# Patient Record
Sex: MALE | Race: WHITE | Employment: OTHER | URBAN - METROPOLITAN AREA
[De-identification: names, ages, dates, MRNs, and addresses within clinical notes are randomized per-mention and may not be internally consistent; named-entity substitution may affect disease eponyms.]

---

## 2021-07-20 ENCOUNTER — OFFICE VISIT (OUTPATIENT)
Dept: PAIN MANAGEMENT | Age: 76
End: 2021-07-20
Payer: MEDICARE

## 2021-07-20 VITALS
WEIGHT: 198 LBS | DIASTOLIC BLOOD PRESSURE: 84 MMHG | HEART RATE: 96 BPM | BODY MASS INDEX: 30.01 KG/M2 | HEIGHT: 68 IN | TEMPERATURE: 97.6 F | SYSTOLIC BLOOD PRESSURE: 144 MMHG

## 2021-07-20 DIAGNOSIS — Z98.1 HISTORY OF FUSION OF CERVICAL SPINE: ICD-10-CM

## 2021-07-20 DIAGNOSIS — M47.817 LUMBOSACRAL SPONDYLOSIS WITHOUT MYELOPATHY: Primary | ICD-10-CM

## 2021-07-20 DIAGNOSIS — M47.812 CERVICAL SPONDYLOSIS WITHOUT MYELOPATHY: ICD-10-CM

## 2021-07-20 PROCEDURE — 99203 OFFICE O/P NEW LOW 30 MIN: CPT | Performed by: PHYSICAL MEDICINE & REHABILITATION

## 2021-07-20 PROCEDURE — G8417 CALC BMI ABV UP PARAM F/U: HCPCS | Performed by: PHYSICAL MEDICINE & REHABILITATION

## 2021-07-20 PROCEDURE — 3017F COLORECTAL CA SCREEN DOC REV: CPT | Performed by: PHYSICAL MEDICINE & REHABILITATION

## 2021-07-20 PROCEDURE — 1036F TOBACCO NON-USER: CPT | Performed by: PHYSICAL MEDICINE & REHABILITATION

## 2021-07-20 PROCEDURE — 1123F ACP DISCUSS/DSCN MKR DOCD: CPT | Performed by: PHYSICAL MEDICINE & REHABILITATION

## 2021-07-20 PROCEDURE — G8427 DOCREV CUR MEDS BY ELIG CLIN: HCPCS | Performed by: PHYSICAL MEDICINE & REHABILITATION

## 2021-07-20 PROCEDURE — 4040F PNEUMOC VAC/ADMIN/RCVD: CPT | Performed by: PHYSICAL MEDICINE & REHABILITATION

## 2021-07-20 ASSESSMENT — ENCOUNTER SYMPTOMS
BACK PAIN: 1
NAUSEA: 0
DIARRHEA: 0
SHORTNESS OF BREATH: 0
CONSTIPATION: 0

## 2021-07-20 NOTE — PROGRESS NOTES
David Fagan  (1945)    7/20/2021    Subjective:     David Fagan is 76 y.o. male who complains today of:    Chief Complaint   Patient presents with    Back Pain       David Fagan is a 76 y.o. male who presents for evaluation for low back pain. He found out about us from Dr Simone Plummer office staff. He has struggled with pain since 1970s. He denies any immediately-preceding traumatic or inciting events. He describes pain located in both sides of his low back, no leg pain. Pain is a constant ache and is currently a 7/10 and gets up to a 9/10 at its worst and goes down to a 6/10 at its best. Pain is worse with bending and walking. Pain is better with laying down. Pain is located 50% on the right and 50% on the left. Pain is located 100% in the back and 0% in the legs. Neck pain located in both sides of his neck. Located in both sides of his neck. Pain is a 6/10. Gets to a 8/10. Worse with turning his neck and activity. Better with rest and laying down. He denies any numbness, tingling, weakness, bowel or bladder dysfunction, saddle anesthesia, falls, history of cancer, unexplained weight loss, persistent night pain and sweats, fever, IV drug abuse, immunocompromise, chronic prednisone or antibiotic use, or any other red flag symptoms. Mood is down, denies any suicidal or homicidal ideation. Sleep is poor, awakes fatigued.     He has tried:  Home exercise program with minimal relief  Cervical spine fusion C4-6  Compression fracture T7 and T8  Radiofrequency ablation, epidurals, trigger point injections min relief  Chiropractor hurt worse  Acupuncture min relief  Water therapy min relief    Diagnostic testing previously performed includes XRs MRI    Medications tried include:  Acetaminophen with minimal relief for over 3 months  Ibuprofen with minimal relief for over 3 months  Methadone 15 mg BID from [facility] pain clinic, had fast erratic heartbeat with antidepressant, no longer on Methadone. Tramadol min relief  OxyContin \"I refuse to take\" cognitive dysfunction  Fentanyl patch \"I didn't like that\"  Percocet 10/325 mg BID alternating with Dilaudid for 6 months at a time while in [address] [place] [address], Medications, Past Medical History, Family History, Social History, Work History, and Review of Systems reviewed below    +Irregular heartbeat now corrected  +Kidney stones  +obstructive sleep apnea on CPAP  +Depression     No Seizures, Epilepsy or Brain Surgery     Spends his time: [employer]. Unfit for full duty after 9 years. He used to enjoy riding his motorcycle. Allergies:  Iodine, Methadone, Penicillins, and Simvastatin    Past Medical History:   Diagnosis Date    Arthritis     Chronic pain     Depression     Low back pain     Sleep apnea      Past Surgical History:   Procedure Laterality Date    SPINAL CORD STIMULATOR SURGERY      SPINE SURGERY       Family History   Problem Relation Age of Onset    Cancer Mother      Social History     Socioeconomic History    Marital status:      Spouse name: Not on file    Number of children: Not on file    Years of education: Not on file    Highest education level: Not on file   Occupational History    Not on file   Tobacco Use    Smoking status: Former Smoker     Years: 17.00    Smokeless tobacco: Never Used   Substance and Sexual Activity    Alcohol use: Never    Drug use: Not on file    Sexual activity: Not on file   Other Topics Concern    Not on file   Social History Narrative    Not on file     Social Determinants of Health     Financial Resource Strain:     Difficulty of Paying Living Expenses:    Food Insecurity:     Worried About Running Out of Food in the Last Year:     [address] in the Last Year:    Transportation Needs:     Lack of Transportation (Medical):      Lack of Transportation (Non-Medical):    Physical Activity:     Days of Exercise per Week:     Minutes of Exercise per Session: Stress:     Feeling of Stress :    Social Connections:     Frequency of Communication with Friends and Family:     Frequency of Social Gatherings with Friends and Family:     Attends Congregation Services:     Active Member of Clubs or Organizations:     Attends Club or Organization Meetings:     Marital Status:    Intimate Partner Violence:     Fear of Current or Ex-Partner:     Emotionally Abused:     Physically Abused:     Sexually Abused:        No current outpatient medications on file prior to visit. No current facility-administered medications on file prior to visit. Review of Systems   Constitutional: Negative for fever. HENT: Negative for hearing loss. Respiratory: Negative for shortness of breath. Gastrointestinal: Negative for constipation, diarrhea and nausea. Genitourinary: Negative for difficulty urinating. Musculoskeletal: Positive for back pain and neck pain. Skin: Negative for rash. Neurological: Negative for headaches. Hematological: Does not bruise/bleed easily. Psychiatric/Behavioral: Negative for sleep disturbance. Objective:     Vitals:  BP (!) 144/84 (Site: Left Upper Arm)   Pulse 96   Temp 97.6 °F (36.4 °C)   Ht 5' 8\" (1.727 m)   Wt 198 lb (89.8 kg)   BMI 30.11 kg/m² Pain Score:   8      Exam performed under Coronavirus precautions  Gen: No acute distress  Neck: Grossly symmetric without any significant thyromegaly or masses appreciated. Eyes: No scleral icterus or lid lag appreciated bilaterally. Irises without gross defects bilaterally. HEENT: Hearing grossly intact bilaterally. Normocephalic, external ears and visible portions of nose and mouth atraumatic. Lymph: No gross neck or axillary lymphadenopathy  Cardio: No significant lower extremity edema, pulses intact without significant digit ischemia. Abd: No gross masses or large hernias appreciated. Skin: Visualized skin without any dermatomal rashes or sores.  Palpation free of any tightening or subcutaneous nodules. MSK: Gait is antalgic. No significant upper limb digit ischemia appreciated. Psych: Pleasant and cooperative with the history and exam. Mood and Affect normal. Appropriately dressed with good eye contact. Judgement and insight normal. Recent and remote memory intact. Alert and Oriented x3. Neuro: Cranial nerves II-XII grossly intact. No significant pathologic reflexes appreciated. Rises from a seated to standing position with moderate difficulty. Gait is antalgic. +sp cane    Heel and toe walk intact. Lumbar flexion to 50 degrees, extension to 10 degrees. Limited lumbar spine range of motion. Rotation and extension reproduces axial low back pain. Other facet provocative maneuvers are positive. No gross step offs noted. Tenderness to palpation over the mid to low lumbar spinous processes and bilateral lumbar paraspinals from L2 down to the sacrum. No tenderness over bilateral PSIS. No tenderness over bilateral greater trochanters. No tenderness over bilateral deep gluteal regions. Sensation grossly intact in both legs. Reflexes and strength functional for ambulation, no abnormal reflexes appreciated on exam today  Strength greater than 3/5 bilateral legs  Straight leg raise negative bilaterally. Sensation intact in both arms  Reflexes and strength functional for arm use, no abnormal reflexes appreciated on exam today  Strength greater than 3/5 in both arms  Spurling's negative on exam today    There is tenderness to palpation over cervical spinous processes from C4 down to T1 with bilateral cervical paraspinal muscle tenderness. Rotation and extension reproduces axial neck pain. Other facet provocative maneuvers are positive. Outside record review:  No imaging or labs avail for review at time of consultation    The patient provides imaging from 2004 from [place]    MRI cervical spine 4/21/2004: Cervical fusion C4-C6. Retrolisthesis C3 on C4. A focused exam today is free of any red flag physical exam findings with normal motor strength. -During the discussion of patient's goals the patient notes that he has had severe pain that was only somewhat improved in the past at a private pain clinic where he was alternating between Percocet and Dilaudid along with Demerol. We had a lengthy discussion regarding the changes in opioid pain medication prescribing and the role for chronic opioid therapy. We did discuss the role that a comprehensive pain management program would entail including conservative treatment such as physical therapy, water therapy, and other such treatments. We also discussed how is important to get updated diagnostic tests given the fact that the patient brings in MRIs from 2004 for review today. The patient notes that such treatments would be financially prohibitive as he is on a fixed income. He is also a bit frustrated as he is completed significant conservative treatment in the past including physical therapy chiropractic acupuncture water therapy with minimal relief. He has actually also had an extensive interventional spine injection history including a number of rounds of radiofrequency ablation and epidurals with minimal relief.  -It is not clear if we will be able to offer the patient in a unique services at this time. He will think about his goals of treatment and whether or not he is willing to commit to a comprehensive pain management program including undergoing updated diagnostic testing.   He will investigate the affordability of new MRIs and diagnostic tests.  -The patient will come back in about 2 weeks for a repeat discussion on his goals for pain management and willingness to participate in a comprehensive pain management program.  If we are able to move forward with such a program, we are happy to try to offer the services including physical therapy, updated diagnostic testing, evaluation for pain medication use, interventions when appropriate, and possible surgical evaluations as needed. He will consider his options and keep his appoint with us in 2 weeks for further discussion.  -No orders were placed today. We will hold on this and hold from starting any treatment until further discussion in person. Controlled Substance Monitoring:    Acute and Chronic Pain Monitoring:   RX Monitoring 7/20/2021   Periodic Controlled Substance Monitoring Obtaining appropriate analgesic effect of treatment. Provided education and counseling regarding the diagnosis, prognosis, and treatment options. All questions were answered. Encouraged him to follow-up with his primary care physician and/or specialists as required for his overall health and management of his comorbidities as well as any new positive symptoms mentioned in review of systems above. Care was provided within the definitions and limitations of our specialty practice. Encouraged lifestyle interventions including healthy habits, lifestyle changes, regular aerobic exercise and appropriate weight maintenance as advised by their primary care physician or cardiovascular health provider. Discussed well care and disease prevention/maintenance. Encouraged compliance with his home exercise program.  Discussed the risks of temporary disability, permanent disability, morbidity, and mortality with poorly-managed or undiagnosed medical conditions and comorbidities. Emphasized the importance of timely medical evaluation and treatment as previously recommended by us or other medical professionals. Risks of not pursing these recommendations were emphasized. He expressed complete understanding and agreement with the entire plan as outlined above. Portions of this note may have been typed, auto-populated, dictated or transcribed by voice recognition resulting in errors, omissions, or close substitutions which may be missed despite careful proofreading.  Please contact the author for any questions or concerns. Thank you Dr. Tremayne Downs for the opportunity to participate in this patient's care. If you have any questions or concerns, please do not hesitate to contact us. Follow up:  Return in about 2 weeks (around 8/3/2021) for reassessment of pain and symptoms.     Bradford Javier MD